# Patient Record
Sex: FEMALE | Race: BLACK OR AFRICAN AMERICAN | NOT HISPANIC OR LATINO | Employment: OTHER | URBAN - METROPOLITAN AREA
[De-identification: names, ages, dates, MRNs, and addresses within clinical notes are randomized per-mention and may not be internally consistent; named-entity substitution may affect disease eponyms.]

---

## 2017-06-29 ENCOUNTER — HISTORICAL (OUTPATIENT)
Dept: ADMINISTRATIVE | Facility: HOSPITAL | Age: 77
End: 2017-06-29

## 2018-05-10 ENCOUNTER — HISTORICAL (OUTPATIENT)
Dept: LAB | Facility: HOSPITAL | Age: 78
End: 2018-05-10

## 2018-05-13 LAB — FINAL CULTURE: NORMAL

## 2018-05-31 ENCOUNTER — HISTORICAL (OUTPATIENT)
Dept: LAB | Facility: HOSPITAL | Age: 78
End: 2018-05-31

## 2018-06-04 LAB — FINAL CULTURE: NORMAL

## 2021-03-16 ENCOUNTER — HISTORICAL (OUTPATIENT)
Dept: ADMINISTRATIVE | Facility: HOSPITAL | Age: 81
End: 2021-03-16

## 2021-03-16 LAB
ABS NEUT (OLG): 2.26 X10(3)/MCL (ref 2.1–9.2)
ALBUMIN SERPL-MCNC: 4.1 GM/DL (ref 3.4–4.8)
ALBUMIN/GLOB SERPL: 1.2 RATIO (ref 1.1–2)
ALP SERPL-CCNC: 77 UNIT/L (ref 40–150)
ALT SERPL-CCNC: 16 UNIT/L (ref 0–55)
APPEARANCE, UA: NORMAL
AST SERPL-CCNC: 23 UNIT/L (ref 5–34)
BACTERIA SPEC CULT: NORMAL /HPF
BASOPHILS # BLD AUTO: 0 X10(3)/MCL (ref 0–0.2)
BASOPHILS NFR BLD AUTO: 1 %
BILIRUB SERPL-MCNC: 0.5 MG/DL
BILIRUB UR QL STRIP: NEGATIVE
BILIRUBIN DIRECT+TOT PNL SERPL-MCNC: 0.2 MG/DL (ref 0–0.5)
BILIRUBIN DIRECT+TOT PNL SERPL-MCNC: 0.3 MG/DL (ref 0–0.8)
BUN SERPL-MCNC: 14.5 MG/DL (ref 9.8–20.1)
CALCIUM SERPL-MCNC: 9.4 MG/DL (ref 8.4–10.2)
CHLORIDE SERPL-SCNC: 110 MMOL/L (ref 98–107)
CHOLEST SERPL-MCNC: 175 MG/DL
CHOLEST/HDLC SERPL: 3 {RATIO} (ref 0–5)
CO2 SERPL-SCNC: 29 MMOL/L (ref 23–31)
COLOR UR: YELLOW
CREAT SERPL-MCNC: 0.93 MG/DL (ref 0.55–1.02)
EOSINOPHIL # BLD AUTO: 0 X10(3)/MCL (ref 0–0.9)
EOSINOPHIL NFR BLD AUTO: 1 %
ERYTHROCYTE [DISTWIDTH] IN BLOOD BY AUTOMATED COUNT: 13.2 % (ref 11.5–17)
GLOBULIN SER-MCNC: 3.5 GM/DL (ref 2.4–3.5)
GLUCOSE (UA): NEGATIVE
GLUCOSE SERPL-MCNC: 99 MG/DL (ref 82–115)
HCT VFR BLD AUTO: 36.8 % (ref 37–47)
HDLC SERPL-MCNC: 56 MG/DL (ref 35–60)
HGB BLD-MCNC: 11.7 GM/DL (ref 12–16)
HGB UR QL STRIP: NEGATIVE
KETONES UR QL STRIP: NEGATIVE
LDLC SERPL CALC-MCNC: 108 MG/DL (ref 50–140)
LEUKOCYTE ESTERASE UR QL STRIP: NEGATIVE
LYMPHOCYTES # BLD AUTO: 1.5 X10(3)/MCL (ref 0.6–4.6)
LYMPHOCYTES NFR BLD AUTO: 36 %
MCH RBC QN AUTO: 28.6 PG (ref 27–31)
MCHC RBC AUTO-ENTMCNC: 31.8 GM/DL (ref 33–36)
MCV RBC AUTO: 90 FL (ref 80–94)
MONOCYTES # BLD AUTO: 0.3 X10(3)/MCL (ref 0.1–1.3)
MONOCYTES NFR BLD AUTO: 7 %
NEUTROPHILS # BLD AUTO: 2.26 X10(3)/MCL (ref 2.1–9.2)
NEUTROPHILS NFR BLD AUTO: 54 %
NITRITE UR QL STRIP: NEGATIVE
PH UR STRIP: 6 [PH] (ref 5–9)
PLATELET # BLD AUTO: 260 X10(3)/MCL (ref 130–400)
PMV BLD AUTO: 9.1 FL (ref 9.4–12.4)
POTASSIUM SERPL-SCNC: 4.6 MMOL/L (ref 3.5–5.1)
PROT SERPL-MCNC: 7.6 GM/DL (ref 5.8–7.6)
PROT UR QL STRIP: NEGATIVE
RBC # BLD AUTO: 4.09 X10(6)/MCL (ref 4.2–5.4)
RBC #/AREA URNS HPF: NORMAL /[HPF]
SODIUM SERPL-SCNC: 143 MMOL/L (ref 136–145)
SP GR UR STRIP: 1.02 (ref 1–1.03)
SQUAMOUS EPITHELIAL, UA: NORMAL
TRIGL SERPL-MCNC: 56 MG/DL (ref 37–140)
UROBILINOGEN UR STRIP-ACNC: 0.2
VLDLC SERPL CALC-MCNC: 11 MG/DL
WBC # SPEC AUTO: 4.2 X10(3)/MCL (ref 4.5–11.5)
WBC #/AREA URNS HPF: NORMAL /[HPF]

## 2021-03-29 ENCOUNTER — HISTORICAL (OUTPATIENT)
Dept: RADIOLOGY | Facility: HOSPITAL | Age: 81
End: 2021-03-29

## 2021-05-24 ENCOUNTER — HISTORICAL (OUTPATIENT)
Dept: RADIOLOGY | Facility: HOSPITAL | Age: 81
End: 2021-05-24

## 2021-05-24 LAB — LIPASE SERPL-CCNC: 41 U/L

## 2022-03-21 ENCOUNTER — HISTORICAL (OUTPATIENT)
Dept: ADMINISTRATIVE | Facility: HOSPITAL | Age: 82
End: 2022-03-21

## 2022-03-21 LAB
ABS NEUT (OLG): 2.54 (ref 2.1–9.2)
ALBUMIN SERPL-MCNC: 3.8 G/DL (ref 3.4–4.8)
ALBUMIN/GLOB SERPL: 1 {RATIO} (ref 1.1–2)
ALP SERPL-CCNC: 72 U/L (ref 40–150)
ALT SERPL-CCNC: 13 U/L (ref 0–55)
APPEARANCE, UA: CLEAR
AST SERPL-CCNC: 20 U/L (ref 5–34)
BACTERIA SPEC CULT: NORMAL
BASOPHILS # BLD AUTO: 0 10*3/UL (ref 0–0.2)
BASOPHILS NFR BLD AUTO: 0 %
BILIRUB SERPL-MCNC: 0.5 MG/DL
BILIRUB UR QL STRIP: NEGATIVE
BILIRUBIN DIRECT+TOT PNL SERPL-MCNC: 0.2 (ref 0–0.5)
BILIRUBIN DIRECT+TOT PNL SERPL-MCNC: 0.3 (ref 0–0.8)
BUDDING YEAST: NORMAL
BUN SERPL-MCNC: 9.5 MG/DL (ref 9.8–20.1)
CALCIUM SERPL-MCNC: 9.5 MG/DL (ref 8.7–10.5)
CASTS, UA: NORMAL
CHLORIDE SERPL-SCNC: 106 MMOL/L (ref 98–107)
CHOLEST SERPL-MCNC: 227 MG/DL
CHOLEST/HDLC SERPL: 5 {RATIO} (ref 0–5)
CO2 SERPL-SCNC: 29 MMOL/L (ref 23–31)
COLOR UR: YELLOW
CREAT SERPL-MCNC: 0.91 MG/DL (ref 0.55–1.02)
CRYSTALS: NORMAL
EOSINOPHIL # BLD AUTO: 0 10*3/UL (ref 0–0.9)
EOSINOPHIL NFR BLD AUTO: 1 %
ERYTHROCYTE [DISTWIDTH] IN BLOOD BY AUTOMATED COUNT: 13.2 % (ref 11.5–17)
GLOBULIN SER-MCNC: 3.9 G/DL (ref 2.4–3.5)
GLUCOSE (UA): NEGATIVE
GLUCOSE SERPL-MCNC: 85 MG/DL (ref 82–115)
HCT VFR BLD AUTO: 36.5 % (ref 37–47)
HDLC SERPL-MCNC: 49 MG/DL (ref 35–60)
HEMOLYSIS INTERF INDEX SERPL-ACNC: <0
HGB BLD-MCNC: 12.3 G/DL (ref 12–16)
HGB UR QL STRIP: NEGATIVE
ICTERIC INTERF INDEX SERPL-ACNC: 1
KETONES UR QL STRIP: NEGATIVE
LDLC SERPL CALC-MCNC: 150 MG/DL (ref 50–140)
LEUKOCYTE ESTERASE UR QL STRIP: NEGATIVE
LIPEMIC INTERF INDEX SERPL-ACNC: 9
LYMPHOCYTES # BLD AUTO: 1.3 10*3/UL (ref 0.6–4.6)
LYMPHOCYTES NFR BLD AUTO: 31 %
MANUAL DIFF? (OHS): NO
MCH RBC QN AUTO: 29.4 PG (ref 27–31)
MCHC RBC AUTO-ENTMCNC: 33.7 G/DL (ref 33–36)
MCV RBC AUTO: 87.3 FL (ref 80–94)
MONOCYTES # BLD AUTO: 0.4 10*3/UL (ref 0.1–1.3)
MONOCYTES NFR BLD AUTO: 9 %
NEUTROPHILS # BLD AUTO: 2.54 10*3/UL (ref 2.1–9.2)
NEUTROPHILS NFR BLD AUTO: 59 %
NITRITE UR QL STRIP: NEGATIVE
PH UR STRIP: 6.5 [PH] (ref 5–9)
PLATELET # BLD AUTO: 246 10*3/UL (ref 130–400)
PMV BLD AUTO: 9 FL (ref 9.4–12.4)
POTASSIUM SERPL-SCNC: 4.1 MMOL/L (ref 3.5–5.1)
PROT SERPL-MCNC: 7.7 G/DL (ref 5.8–7.6)
PROT UR QL STRIP: NEGATIVE
RBC # BLD AUTO: 4.18 10*6/UL (ref 4.2–5.4)
RBC #/AREA URNS HPF: NORMAL /[HPF] (ref 0–2)
SMALL ROUND CELLS, UA: NORMAL
SODIUM SERPL-SCNC: 140 MMOL/L (ref 136–145)
SP GR UR STRIP: 1.02 (ref 1–1.03)
SPERM URNS QL MICRO: NORMAL
SQUAMOUS EPITHELIAL, UA: NORMAL (ref 0–4)
TRIGL SERPL-MCNC: 141 MG/DL (ref 37–140)
UROBILINOGEN UR STRIP-ACNC: 0.2
VLDLC SERPL CALC-MCNC: 28 MG/DL
WBC # SPEC AUTO: 4.3 10*3/UL (ref 4.5–11.5)
WBC #/AREA URNS HPF: NORMAL /[HPF] (ref 0–2)

## 2022-04-10 ENCOUNTER — HISTORICAL (OUTPATIENT)
Dept: ADMINISTRATIVE | Facility: HOSPITAL | Age: 82
End: 2022-04-10

## 2022-04-26 VITALS
BODY MASS INDEX: 28.63 KG/M2 | OXYGEN SATURATION: 99 % | HEIGHT: 59 IN | SYSTOLIC BLOOD PRESSURE: 130 MMHG | DIASTOLIC BLOOD PRESSURE: 70 MMHG | WEIGHT: 142 LBS

## 2023-03-22 ENCOUNTER — TELEPHONE (OUTPATIENT)
Dept: INTERNAL MEDICINE | Facility: CLINIC | Age: 83
End: 2023-03-22
Payer: MEDICARE

## 2023-11-12 ENCOUNTER — OFFICE VISIT (OUTPATIENT)
Dept: URGENT CARE | Facility: CLINIC | Age: 83
End: 2023-11-12
Payer: MEDICARE

## 2023-11-12 VITALS
OXYGEN SATURATION: 98 % | DIASTOLIC BLOOD PRESSURE: 78 MMHG | WEIGHT: 141 LBS | BODY MASS INDEX: 29.6 KG/M2 | HEART RATE: 83 BPM | HEIGHT: 58 IN | SYSTOLIC BLOOD PRESSURE: 127 MMHG | TEMPERATURE: 98 F | RESPIRATION RATE: 18 BRPM

## 2023-11-12 DIAGNOSIS — J01.40 ACUTE NON-RECURRENT PANSINUSITIS: ICD-10-CM

## 2023-11-12 DIAGNOSIS — R05.9 COUGH, UNSPECIFIED TYPE: Primary | ICD-10-CM

## 2023-11-12 LAB
CTP QC/QA: YES
MOLECULAR STREP A: NEGATIVE
POC MOLECULAR INFLUENZA A AGN: NEGATIVE
POC MOLECULAR INFLUENZA B AGN: NEGATIVE
SARS-COV-2 RDRP RESP QL NAA+PROBE: NEGATIVE

## 2023-11-12 PROCEDURE — 87502 POCT INFLUENZA A/B MOLECULAR: ICD-10-PCS | Mod: QW,,, | Performed by: NURSE PRACTITIONER

## 2023-11-12 PROCEDURE — 87502 INFLUENZA DNA AMP PROBE: CPT | Mod: QW,,, | Performed by: NURSE PRACTITIONER

## 2023-11-12 PROCEDURE — 87651 STREP A DNA AMP PROBE: CPT | Mod: QW,,, | Performed by: NURSE PRACTITIONER

## 2023-11-12 PROCEDURE — 87651 POCT STREP A MOLECULAR: ICD-10-PCS | Mod: QW,,, | Performed by: NURSE PRACTITIONER

## 2023-11-12 PROCEDURE — 87635 SARS-COV-2 COVID-19 AMP PRB: CPT | Mod: QW,,, | Performed by: NURSE PRACTITIONER

## 2023-11-12 PROCEDURE — 99213 OFFICE O/P EST LOW 20 MIN: CPT | Mod: ,,, | Performed by: NURSE PRACTITIONER

## 2023-11-12 PROCEDURE — 87635: ICD-10-PCS | Mod: QW,,, | Performed by: NURSE PRACTITIONER

## 2023-11-12 PROCEDURE — 99213 PR OFFICE/OUTPT VISIT, EST, LEVL III, 20-29 MIN: ICD-10-PCS | Mod: ,,, | Performed by: NURSE PRACTITIONER

## 2023-11-12 RX ORDER — CHLOPHEDIANOL HCL AND PYRILAMINE MALEATE 12.5; 12.5 MG/5ML; MG/5ML
10 SOLUTION ORAL 3 TIMES DAILY PRN
Qty: 120 ML | Refills: 0 | Status: SHIPPED | OUTPATIENT
Start: 2023-11-12 | End: 2023-11-19

## 2023-11-12 RX ORDER — PREDNISONE 10 MG/1
30 TABLET ORAL DAILY
Qty: 15 TABLET | Refills: 0 | Status: SHIPPED | OUTPATIENT
Start: 2023-11-12 | End: 2023-11-17

## 2023-11-12 RX ORDER — AZITHROMYCIN 250 MG/1
TABLET, FILM COATED ORAL
Qty: 6 EACH | Refills: 0 | Status: SHIPPED | OUTPATIENT
Start: 2023-11-12 | End: 2023-11-17

## 2023-11-12 RX ORDER — PANTOPRAZOLE SODIUM 40 MG/1
40 TABLET, DELAYED RELEASE ORAL
COMMUNITY
Start: 2021-10-12

## 2023-11-12 NOTE — PROGRESS NOTES
"Subjective:      Patient ID: Nisha Arroyo is a 83 y.o. female.    Vitals:  height is 4' 10" (1.473 m) and weight is 64 kg (141 lb). Her temperature is 98.2 °F (36.8 °C). Her blood pressure is 127/78 and her pulse is 83. Her respiration is 18 and oxygen saturation is 98%.     Chief Complaint: Nasal Congestion ( Patient is a 83 y.o. female who presents to urgent care with complaints of coughing, chest congestion, sore throat, ear pain x4 days. Alleviating factors include zyrtec with mild amount of relief. )    83-year-old  female presents to clinic complaining of sinus pain, productive yellow cough, ear pressure, and neck pain secondary to swollen glands x1 week.  Patient states symptoms are progressively getting worse.  Patient denies any fever, abdominal pain, shortness of breath, chest pain, or nausea and vomiting.      Constitution: Negative for chills and fever.   HENT:  Positive for ear pain, congestion, sinus pain, sinus pressure and sore throat.    Neck: Positive for painful lymph nodes.   Cardiovascular: Negative.    Eyes: Negative.    Respiratory: Negative.     Gastrointestinal: Negative.    Endocrine: negative.   Genitourinary: Negative.    Musculoskeletal: Negative.    Skin: Negative.    Allergic/Immunologic: Negative.    Neurological:  Positive for headaches.   Hematologic/Lymphatic: Positive for swollen lymph nodes.   Psychiatric/Behavioral: Negative.        Objective:     Physical Exam   Constitutional: She is oriented to person, place, and time. She appears well-developed. She is cooperative. She appears ill.   HENT:   Head: Normocephalic and atraumatic.   Ears:   Right Ear: Hearing, external ear and ear canal normal. Tympanic membrane is bulging.   Left Ear: Hearing, external ear and ear canal normal. Tympanic membrane is bulging.   Nose: Sinus tenderness and congestion present. No mucosal edema or nasal deformity. No epistaxis. Right sinus exhibits maxillary sinus tenderness and " frontal sinus tenderness. Left sinus exhibits maxillary sinus tenderness and frontal sinus tenderness.   Mouth/Throat: Uvula is midline and oropharynx is clear and moist. Mucous membranes are dry. No trismus in the jaw. Normal dentition. No uvula swelling.   Eyes: Conjunctivae and lids are normal. Pupils are equal, round, and reactive to light.   Neck: Trachea normal and phonation normal. Neck supple.   Cardiovascular: Normal rate, regular rhythm, normal heart sounds and normal pulses.   Pulmonary/Chest: Effort normal and breath sounds normal.   Abdominal: Normal appearance and bowel sounds are normal. Soft.   Musculoskeletal: Normal range of motion.         General: Normal range of motion.   Lymphadenopathy:     She has cervical adenopathy.        Right cervical: Superficial cervical adenopathy present.        Left cervical: Superficial cervical adenopathy present.   Neurological: She is alert and oriented to person, place, and time. She exhibits normal muscle tone.   Skin: Skin is warm, dry and intact.   Psychiatric: Her speech is normal and behavior is normal. Judgment and thought content normal.   Nursing note and vitals reviewed.      Assessment:     1. Cough, unspecified type    2. Acute non-recurrent pansinusitis        Plan:   DISCHARGE INSTRUCTIONS:    Call your local emergency number (911 in the [place]) if:  You have chest pain or trouble breathing.    Seek care immediately if:  You have a fever over 102ºF (39ºC).    Call your doctor if:  You have a low fever.  Your sore throat gets worse or you see white or yellow spots in your throat.  Your symptoms get worse after 3 to 5 days or are not better in 14 days.  You have a rash anywhere on your skin.  You have large, tender lumps in your neck.  You have thick, green, or yellow drainage from your nose.  You cough up thick yellow, green, or bloody mucus.  You have a bad earache.  You have questions or concerns about your condition or care.    Medicines:  You may  need any of the following:    Decongestants help reduce nasal congestion and help you breathe more easily. If you take decongestant pills, they may make you feel restless or cause problems with your sleep. Do not use decongestant sprays for more than a few days.    Cough suppressants help reduce coughing. Ask your healthcare provider which type of cough medicine is best for you.    NSAIDs , such as ibuprofen, help decrease swelling, pain, and fever. NSAIDs can cause stomach bleeding or kidney problems in certain people. If you take blood thinner medicine, always ask your healthcare provider if NSAIDs are safe for you. Always read the medicine label and follow directions.    Acetaminophen decreases pain and fever. It is available without a doctor's order. Ask how much to take and how often to take it. Follow directions. Read the labels of all other medicines you are using to see if they also contain acetaminophen, or ask your doctor or pharmacist. Acetaminophen can cause liver damage if not taken correctly.    Take your medicine as directed. Contact your healthcare provider if you think your medicine is not helping or if you have side effects. Tell your provider if you are allergic to any medicine. Keep a list of the medicines, vitamins, and herbs you take. Include the amounts, and when and why you take them. Bring the list or the pill bottles to follow-up visits. Carry your medicine list with you in case of an emergency.    Self-care:  Rest as much as possible. Slowly start to do more each day.    Drink more liquids as directed. Liquids will help thin and loosen mucus so you can cough it up. Liquids will also help prevent dehydration. Liquids that help prevent dehydration include water, fruit juice, and broth. Do not drink liquids that contain caffeine. Caffeine can increase your risk for dehydration. Ask your healthcare provider how much liquid to drink each day.  Soothe a sore throat. Gargle with warm salt water.  Make salt water by dissolving ¼ teaspoon salt in 1 cup warm water. You may also suck on hard candy or throat lozenges. You may use a sore throat spray.    Use a humidifier or vaporizer. Use a cool mist humidifier or a vaporizer to increase air moisture in your home. This may make it easier for you to breathe and help decrease your cough.  Use saline nasal drops as directed. These help relieve congestion.    Apply petroleum-based jelly around the outside of your nostrils. This can decrease irritation from blowing your nose.    Do not smoke. Nicotine and other chemicals in cigarettes and cigars can make your symptoms worse. They can also cause infections such as bronchitis or pneumonia. Ask your healthcare provider for information if you currently smoke and need help to quit. E-cigarettes or smokeless tobacco still contain nicotine. Talk to your healthcare provider before you use these products.    Prevent a cold:  Wash your hands often. Use soap and water every time you wash your hands. Rub your soapy hands together, lacing your fingers. Use the fingers of one hand to scrub under the nails of the other hand. Wash for at least 20 seconds. Rinse with warm, running water for several seconds. Then dry your hands. Use hand  gel if soap and water are not available. Do not touch your eyes or mouth without washing your hands first.    Handwashing    Cover a sneeze or cough. Use a tissue that covers your mouth and nose. Put the used tissue in the trash right away. Use the bend of your arm if a tissue is not available. Wash your hands well with soap and water or use a hand . Do not stand close to anyone who is sneezing or coughing.    Try to stay away from others while you are sick. This is especially important during the first 2 to 3 days when the virus is more easily spread. Wait until a fever, cough, or other symptoms are gone before you return to work or other regular activities.    Do not share items  "while you are sick. This includes food, drinks, eating utensils, and dishes.     Cough, unspecified type  -     POCT COVID-19 Rapid Screening  -     POCT Influenza A/B Molecular  -     POCT Strep A, Molecular    Acute non-recurrent pansinusitis    Other orders  -     azithromycin (Z-OMAR) 250 MG tablet; Take 2 tablets by mouth on day 1; Take 1 tablet by mouth on days 2-5  Dispense: 6 each; Refill: 0  -     predniSONE (DELTASONE) 10 MG tablet; Take 3 tablets (30 mg total) by mouth once daily. for 5 days  Dispense: 15 tablet; Refill: 0  -     pyrilamine-chlophedianoL (NINJACOF) 12.5-12.5 mg/5 mL Liqd; Take 10 mLs by mouth 3 (three) times daily as needed (cough).  Dispense: 120 mL; Refill: 0                       Previous History      Review of patient's allergies indicates:   Allergen Reactions    Hydrocodone Hives    Oxycodone Hives and Itching    Ciprofloxacin        History reviewed. No pertinent past medical history.  Current Outpatient Medications   Medication Instructions    azithromycin (Z-OMAR) 250 MG tablet Take 2 tablets by mouth on day 1; Take 1 tablet by mouth on days 2-5<BR>    pantoprazole (PROTONIX) 40 mg, Oral    predniSONE (DELTASONE) 30 mg, Oral, Daily    pyrilamine-chlophedianoL (NINJACOF) 12.5-12.5 mg/5 mL Liqd 10 mLs, Oral, 3 times daily PRN     History reviewed. No pertinent surgical history.  Family History   Problem Relation Age of Onset    Cancer Mother     Emphysema Father        Social History     Tobacco Use    Smoking status: Never    Smokeless tobacco: Never   Substance Use Topics    Alcohol use: Never    Drug use: Never        Physical Exam      Vital Signs Reviewed   /78   Pulse 83   Temp 98.2 °F (36.8 °C)   Resp 18   Ht 4' 10" (1.473 m)   Wt 64 kg (141 lb)   SpO2 98%   BMI 29.47 kg/m²        Procedures    Procedures     Labs     Results for orders placed or performed in visit on 11/12/23   POCT COVID-19 Rapid Screening   Result Value Ref Range    POC Rapid COVID " Negative Negative     Acceptable Yes    POCT Influenza A/B Molecular   Result Value Ref Range    POC Molecular Influenza A Ag Negative Negative, Not Reported    POC Molecular Influenza B Ag Negative Negative, Not Reported     Acceptable Yes    POCT Strep A, Molecular   Result Value Ref Range    Molecular Strep A, POC Negative Negative     Acceptable Yes

## 2024-05-24 ENCOUNTER — OFFICE VISIT (OUTPATIENT)
Dept: URGENT CARE | Facility: CLINIC | Age: 84
End: 2024-05-24
Payer: MEDICARE

## 2024-05-24 VITALS
RESPIRATION RATE: 18 BRPM | HEIGHT: 58 IN | TEMPERATURE: 99 F | BODY MASS INDEX: 29.6 KG/M2 | OXYGEN SATURATION: 98 % | HEART RATE: 102 BPM | WEIGHT: 141 LBS | SYSTOLIC BLOOD PRESSURE: 129 MMHG | DIASTOLIC BLOOD PRESSURE: 84 MMHG

## 2024-05-24 DIAGNOSIS — U07.1 COVID-19: Primary | ICD-10-CM

## 2024-05-24 DIAGNOSIS — U07.1 COVID-19 VIRUS DETECTED: ICD-10-CM

## 2024-05-24 DIAGNOSIS — J32.9 SINUSITIS, UNSPECIFIED CHRONICITY, UNSPECIFIED LOCATION: ICD-10-CM

## 2024-05-24 LAB
CTP QC/QA: YES
SARS-COV-2 RDRP RESP QL NAA+PROBE: POSITIVE

## 2024-05-24 PROCEDURE — 87635 SARS-COV-2 COVID-19 AMP PRB: CPT | Mod: QW,,,

## 2024-05-24 PROCEDURE — 99213 OFFICE O/P EST LOW 20 MIN: CPT | Mod: ,,,

## 2024-05-24 RX ORDER — DOXYCYCLINE 100 MG/1
100 CAPSULE ORAL EVERY 12 HOURS
Qty: 14 CAPSULE | Refills: 0 | Status: SHIPPED | OUTPATIENT
Start: 2024-05-24 | End: 2024-05-31

## 2024-05-24 RX ORDER — CETIRIZINE HYDROCHLORIDE 5 MG/1
5 TABLET ORAL DAILY
COMMUNITY

## 2024-05-24 NOTE — PATIENT INSTRUCTIONS
COVID positive  Discussed Paxlovid therapy is an antiviral to keep you from developing severe complications from COVID including hospitalization.  COVID-19 is a virus.  Doxycycline prescription is an antibiotic to cover for bacterial infection.  If symptoms fail to improve or you develop fever, may beginning antibiotic therapy.      Fever / Body Aches: Use OTC Tylenol or Motrin per package instructions for fever or body aches.  Sore Throat: Use OTC lozenges or throat sprays, gargle with warm salt and water, warm tea with honey.   Cough:  Over-the-counter cough medication for daytime cough as needed and as directed.  Addition of over-the-counter antihistamine such as Claritin, Zyrtec for runny nose, congestion, postnasal drip.  May also use Flonase nasal steroid spray.  Cover your mouth with coughing, avoid sharing cups or lip balm, wash your hand before eating or touching your face.      The updated Respiratory Virus Guidance recommends that people stay home and away from others until at least 24 hours after both their symptoms are getting better overall, and they have not had a fever (and are not using fever-reducing medication). Note that depending on the length of symptoms, this period could be shorter, the same, or longer than the previous guidance for COVID-19.     Follow up with you primary care doctor as needed.      Updated CDC guidelines can be found at: https://www.cdc.gov/coronavirus/2019-ncov/hcp/xsyoti-fj-afjv.html    Seek emergency care for breathing difficulties, chest pain, shortness of breath, or confusion. Given the nature of this disease, severe respiratory distress can develop suddenly on day 8 or 9 of clinical course.  Follow up with you primary care doctor as needed.      Updated CDC guidelines can be found at: https://www.cdc.gov/coronavirus/2019-ncov/hcp/jpnbeg-hv-wngx.html

## 2024-05-24 NOTE — PROGRESS NOTES
"Subjective:      Patient ID: Nisha Arroyo is a 84 y.o. female.    Vitals:  height is 4' 10" (1.473 m) and weight is 64 kg (141 lb). Her temperature is 98.7 °F (37.1 °C). Her blood pressure is 129/84 and her pulse is 102. Her respiration is 18 and oxygen saturation is 98%.     Chief Complaint: Sinus Problem    84 y.o. female presents presents to clinic with c/o cough that has been present for at least 4 days however she believes it may have been longer.  Cough worsening last night.  Pt also c/o post nasal drip and nasal congestion, rhinorrhea, bilateral maxillary sinus pressure. Pt has tried zyrtec.  She does report some reproducible discomfort near her ribs with coughing.  Chills today.  She feels the same as she did last time she had a sinus infection.  Denies fever, body aches,  neck stiffness, rash, GI symptoms, shortness of breath.  Patient has been vaccinated for COVID and has had COVID in the past and did not require hospitalization.  She does not believe she is taken Paxlovid therapy in the past.    ROS   Objective:     Physical Exam   Constitutional: She is oriented to person, place, and time. She appears well-developed. She is cooperative.  Non-toxic appearance. She does not appear ill. No distress.   HENT:   Head: Normocephalic and atraumatic.   Ears:   Right Ear: Hearing, tympanic membrane, external ear and ear canal normal.   Left Ear: Hearing, tympanic membrane, external ear and ear canal normal.   Nose: Rhinorrhea and congestion present. No mucosal edema or nasal deformity. No epistaxis. Right sinus exhibits maxillary sinus tenderness. Right sinus exhibits no frontal sinus tenderness. Left sinus exhibits maxillary sinus tenderness. Left sinus exhibits no frontal sinus tenderness.   Mouth/Throat: Uvula is midline, oropharynx is clear and moist and mucous membranes are normal. Mucous membranes are moist. No trismus in the jaw. Normal dentition. No uvula swelling. No oropharyngeal exudate, posterior " oropharyngeal edema or posterior oropharyngeal erythema.   Eyes: Conjunctivae and lids are normal. No scleral icterus.   Neck: Trachea normal and phonation normal. Neck supple. No edema present. No erythema present. No neck rigidity present.   Cardiovascular: Normal rate, regular rhythm, normal heart sounds and normal pulses.   Pulmonary/Chest: Effort normal and breath sounds normal. No respiratory distress. She has no decreased breath sounds. She has no wheezes. She has no rhonchi. She has no rales.   Abdominal: Normal appearance.   Musculoskeletal: Normal range of motion.         General: No deformity. Normal range of motion.   Neurological: She is alert and oriented to person, place, and time. She exhibits normal muscle tone. Coordination normal.   Skin: Skin is warm, dry, intact, not diaphoretic and not pale.   Psychiatric: Her speech is normal and behavior is normal. Judgment and thought content normal.   Nursing note and vitals reviewed.         Previous History      Review of patient's allergies indicates:   Allergen Reactions    Hydrocodone Hives    Oxycodone Hives and Itching    Ciprofloxacin        Past Medical History:   Diagnosis Date    GERD (gastroesophageal reflux disease)      Current Outpatient Medications   Medication Instructions    cetirizine (ZYRTEC) 5 mg, Oral, Daily    doxycycline (MONODOX) 100 mg, Oral, Every 12 hours    nirmatrelvir-ritonavir 150-100 mg DsPk 2 tablets, Oral, 2 times daily, Take 2 tablets by mouth 2 times daily.  Each dose contains 1 nirmatrelvir (pink tablet) and 1 ritonavir (white tablet). Take all 3 tablets together.    pantoprazole (PROTONIX) 40 mg, Oral     History reviewed. No pertinent surgical history.  Family History   Problem Relation Name Age of Onset    Cancer Mother      Emphysema Father         Social History     Tobacco Use    Smoking status: Never    Smokeless tobacco: Never   Substance Use Topics    Alcohol use: Never    Drug use: Never        Physical Exam   "    Vital Signs Reviewed   /84   Pulse 102   Temp 98.7 °F (37.1 °C)   Resp 18   Ht 4' 10" (1.473 m)   Wt 64 kg (141 lb)   SpO2 98%   BMI 29.47 kg/m²        Procedures    Procedures     Labs     Results for orders placed or performed in visit on 05/24/24   POCT COVID-19 Rapid Screening   Result Value Ref Range    POC Rapid COVID Positive (A) Negative     Acceptable Yes        Assessment:     1. COVID-19    2. Sinusitis, unspecified chronicity, unspecified location        Plan:       COVID-19  -     POCT COVID-19 Rapid Screening  -     nirmatrelvir-ritonavir 150-100 mg DsPk; Take 2 tablets by mouth 2 (two) times a day. Take 2 tablets by mouth 2 times daily.  Each dose contains 1 nirmatrelvir (pink tablet) and 1 ritonavir (white tablet). Take all 3 tablets together. for 5 days  Dispense: 20 tablet; Refill: 0    Sinusitis, unspecified chronicity, unspecified location  -     doxycycline (MONODOX) 100 MG capsule; Take 1 capsule (100 mg total) by mouth every 12 (twelve) hours. for 7 days  Dispense: 14 capsule; Refill: 0    Based on patient's age discussed Paxlovid therapy to prevent severe complications of COVID-19.    Lung sounds clear, oxygen 98%, patient is afebrile.  Due to this and prescribing antibiotics will avoid chest x-ray in clinic at this time.  Patient denies any history of kidney disease, last kidney function 4 days ago, creatinine 1.02, EGFR 54  Same as a year ago.  Will cover with renal dose Paxlovid.  Discussed Paxlovid therapy is an antiviral to keep you from developing severe complications from COVID including hospitalization.  COVID-19 is a virus.  Doxycycline prescription is an antibiotic to cover for bacterial infection.  If symptoms fail to improve or you develop fever, may beginning antibiotic therapy.      COVID positive  Fever / Body Aches: Use OTC Tylenol or Motrin per package instructions for fever or body aches.  Sore Throat: Use OTC lozenges or throat sprays, gargle " with warm salt and water, warm tea with honey.   Cough:  Over-the-counter cough medication for daytime cough as needed and as directed.  Addition of over-the-counter antihistamine such as Claritin, Zyrtec for runny nose, congestion, postnasal drip.  May also use Flonase nasal steroid spray.  Cover your mouth with coughing, avoid sharing cups or lip balm, wash your hand before eating or touching your face.      The updated Respiratory Virus Guidance recommends that people stay home and away from others until at least 24 hours after both their symptoms are getting better overall, and they have not had a fever (and are not using fever-reducing medication). Note that depending on the length of symptoms, this period could be shorter, the same, or longer than the previous guidance for COVID-19.     Follow up with you primary care doctor as needed.      Updated CDC guidelines can be found at: https://www.cdc.gov/coronavirus/2019-ncov/hcp/hlisth-hw-xujr.html    Seek emergency care for breathing difficulties, chest pain, shortness of breath, or confusion. Given the nature of this disease, severe respiratory distress can develop suddenly on day 8 or 9 of clinical course.  Follow up with you primary care doctor as needed.      Updated CDC guidelines can be found at: https://www.cdc.gov/coronavirus/2019-ncov/hcp/iudarx-mc-ywen.html

## 2024-06-01 ENCOUNTER — HOSPITAL ENCOUNTER (EMERGENCY)
Facility: HOSPITAL | Age: 84
Discharge: HOME | End: 2024-06-01
Attending: EMERGENCY MEDICINE
Payer: MEDICARE

## 2024-06-01 VITALS
HEART RATE: 71 BPM | OXYGEN SATURATION: 98 % | SYSTOLIC BLOOD PRESSURE: 168 MMHG | WEIGHT: 140 LBS | DIASTOLIC BLOOD PRESSURE: 83 MMHG | BODY MASS INDEX: 28.22 KG/M2 | TEMPERATURE: 98 F | RESPIRATION RATE: 14 BRPM | HEIGHT: 59 IN

## 2024-06-01 DIAGNOSIS — K20.80 PILL ESOPHAGITIS: Primary | ICD-10-CM

## 2024-06-01 DIAGNOSIS — T50.905A PILL ESOPHAGITIS: Primary | ICD-10-CM

## 2024-06-01 DIAGNOSIS — R07.9 ACUTE CHEST PAIN: ICD-10-CM

## 2024-06-01 DIAGNOSIS — R07.9 CHEST PAIN: ICD-10-CM

## 2024-06-01 LAB
ALBUMIN SERPL-MCNC: 3.5 G/DL (ref 3.4–4.8)
ALBUMIN/GLOB SERPL: 1 RATIO (ref 1.1–2)
ALP SERPL-CCNC: 63 UNIT/L (ref 40–150)
ALT SERPL-CCNC: 11 UNIT/L (ref 0–55)
ANION GAP SERPL CALC-SCNC: 11 MEQ/L
AST SERPL-CCNC: 18 UNIT/L (ref 5–34)
BASOPHILS # BLD AUTO: 0.02 X10(3)/MCL
BASOPHILS NFR BLD AUTO: 0.3 %
BILIRUB SERPL-MCNC: 0.4 MG/DL
BNP BLD-MCNC: 34.4 PG/ML
BUN SERPL-MCNC: 14.8 MG/DL (ref 9.8–20.1)
CALCIUM SERPL-MCNC: 9 MG/DL (ref 8.4–10.2)
CHLORIDE SERPL-SCNC: 107 MMOL/L (ref 98–107)
CO2 SERPL-SCNC: 23 MMOL/L (ref 23–31)
CREAT SERPL-MCNC: 0.87 MG/DL (ref 0.55–1.02)
CREAT/UREA NIT SERPL: 17
EOSINOPHIL # BLD AUTO: 0.06 X10(3)/MCL (ref 0–0.9)
EOSINOPHIL NFR BLD AUTO: 0.9 %
ERYTHROCYTE [DISTWIDTH] IN BLOOD BY AUTOMATED COUNT: 12.8 % (ref 11.5–17)
GFR SERPLBLD CREATININE-BSD FMLA CKD-EPI: >60 ML/MIN/1.73/M2
GLOBULIN SER-MCNC: 3.4 GM/DL (ref 2.4–3.5)
GLUCOSE SERPL-MCNC: 98 MG/DL (ref 82–115)
HCT VFR BLD AUTO: 32.3 % (ref 37–47)
HGB BLD-MCNC: 10.8 G/DL (ref 12–16)
IMM GRANULOCYTES # BLD AUTO: 0.01 X10(3)/MCL (ref 0–0.04)
IMM GRANULOCYTES NFR BLD AUTO: 0.1 %
LYMPHOCYTES # BLD AUTO: 1.89 X10(3)/MCL (ref 0.6–4.6)
LYMPHOCYTES NFR BLD AUTO: 28.1 %
MCH RBC QN AUTO: 29 PG (ref 27–31)
MCHC RBC AUTO-ENTMCNC: 33.4 G/DL (ref 33–36)
MCV RBC AUTO: 86.8 FL (ref 80–94)
MONOCYTES # BLD AUTO: 0.59 X10(3)/MCL (ref 0.1–1.3)
MONOCYTES NFR BLD AUTO: 8.8 %
NEUTROPHILS # BLD AUTO: 4.16 X10(3)/MCL (ref 2.1–9.2)
NEUTROPHILS NFR BLD AUTO: 61.8 %
PLATELET # BLD AUTO: 214 X10(3)/MCL (ref 130–400)
PMV BLD AUTO: 9.1 FL (ref 7.4–10.4)
POTASSIUM SERPL-SCNC: 3.6 MMOL/L (ref 3.5–5.1)
PROT SERPL-MCNC: 6.9 GM/DL (ref 5.8–7.6)
RBC # BLD AUTO: 3.72 X10(6)/MCL (ref 4.2–5.4)
SODIUM SERPL-SCNC: 141 MMOL/L (ref 136–145)
TROPONIN I SERPL-MCNC: <0.01 NG/ML (ref 0–0.04)
WBC # SPEC AUTO: 6.73 X10(3)/MCL (ref 4.5–11.5)

## 2024-06-01 PROCEDURE — 25000003 PHARM REV CODE 250: Performed by: EMERGENCY MEDICINE

## 2024-06-01 PROCEDURE — 93010 ELECTROCARDIOGRAM REPORT: CPT | Mod: ,,, | Performed by: INTERNAL MEDICINE

## 2024-06-01 PROCEDURE — 83880 ASSAY OF NATRIURETIC PEPTIDE: CPT | Performed by: EMERGENCY MEDICINE

## 2024-06-01 PROCEDURE — 94760 N-INVAS EAR/PLS OXIMETRY 1: CPT

## 2024-06-01 PROCEDURE — 99285 EMERGENCY DEPT VISIT HI MDM: CPT | Mod: 25

## 2024-06-01 PROCEDURE — 84484 ASSAY OF TROPONIN QUANT: CPT | Performed by: EMERGENCY MEDICINE

## 2024-06-01 PROCEDURE — 25000242 PHARM REV CODE 250 ALT 637 W/ HCPCS: Performed by: EMERGENCY MEDICINE

## 2024-06-01 PROCEDURE — 85025 COMPLETE CBC W/AUTO DIFF WBC: CPT | Performed by: EMERGENCY MEDICINE

## 2024-06-01 PROCEDURE — 80053 COMPREHEN METABOLIC PANEL: CPT | Performed by: EMERGENCY MEDICINE

## 2024-06-01 PROCEDURE — 94640 AIRWAY INHALATION TREATMENT: CPT

## 2024-06-01 PROCEDURE — 25500020 PHARM REV CODE 255: Performed by: EMERGENCY MEDICINE

## 2024-06-01 PROCEDURE — 99900035 HC TECH TIME PER 15 MIN (STAT)

## 2024-06-01 PROCEDURE — 93005 ELECTROCARDIOGRAM TRACING: CPT

## 2024-06-01 PROCEDURE — 99900031 HC PATIENT EDUCATION (STAT)

## 2024-06-01 RX ORDER — ALUMINUM HYDROXIDE, MAGNESIUM HYDROXIDE, AND SIMETHICONE 1200; 120; 1200 MG/30ML; MG/30ML; MG/30ML
30 SUSPENSION ORAL ONCE
Status: COMPLETED | OUTPATIENT
Start: 2024-06-01 | End: 2024-06-01

## 2024-06-01 RX ORDER — LIDOCAINE HYDROCHLORIDE 20 MG/ML
15 SOLUTION OROPHARYNGEAL ONCE
Status: COMPLETED | OUTPATIENT
Start: 2024-06-01 | End: 2024-06-01

## 2024-06-01 RX ORDER — SUCRALFATE 1 G/1
1 TABLET ORAL
Qty: 56 TABLET | Refills: 0 | Status: SHIPPED | OUTPATIENT
Start: 2024-06-01 | End: 2024-06-15

## 2024-06-01 RX ORDER — ASPIRIN 325 MG
325 TABLET ORAL
Status: DISCONTINUED | OUTPATIENT
Start: 2024-06-01 | End: 2024-06-01 | Stop reason: HOSPADM

## 2024-06-01 RX ORDER — ALBUTEROL SULFATE 2.5 MG/.5ML
2.5 SOLUTION RESPIRATORY (INHALATION) EVERY 6 HOURS PRN
Qty: 10 EACH | Refills: 1 | Status: SHIPPED | OUTPATIENT
Start: 2024-06-01 | End: 2025-06-01

## 2024-06-01 RX ORDER — IPRATROPIUM BROMIDE AND ALBUTEROL SULFATE 2.5; .5 MG/3ML; MG/3ML
3 SOLUTION RESPIRATORY (INHALATION)
Status: COMPLETED | OUTPATIENT
Start: 2024-06-01 | End: 2024-06-01

## 2024-06-01 RX ORDER — PANTOPRAZOLE SODIUM 20 MG/1
20 TABLET, DELAYED RELEASE ORAL DAILY
Qty: 14 TABLET | Refills: 0 | Status: SHIPPED | OUTPATIENT
Start: 2024-06-01 | End: 2024-06-15

## 2024-06-01 RX ORDER — NITROGLYCERIN 0.4 MG/1
0.4 TABLET SUBLINGUAL EVERY 5 MIN PRN
Status: DISCONTINUED | OUTPATIENT
Start: 2024-06-01 | End: 2024-06-01 | Stop reason: HOSPADM

## 2024-06-01 RX ADMIN — LIDOCAINE HYDROCHLORIDE 15 ML: 20 SOLUTION ORAL at 06:06

## 2024-06-01 RX ADMIN — ALUMINUM HYDROXIDE, MAGNESIUM HYDROXIDE, AND SIMETHICONE 30 ML: 200; 200; 20 SUSPENSION ORAL at 06:06

## 2024-06-01 RX ADMIN — IPRATROPIUM BROMIDE AND ALBUTEROL SULFATE 3 ML: .5; 3 SOLUTION RESPIRATORY (INHALATION) at 01:06

## 2024-06-01 RX ADMIN — IOHEXOL 60 ML: 350 INJECTION, SOLUTION INTRAVENOUS at 04:06

## 2024-06-01 RX ADMIN — NITROGLYCERIN 0.4 MG: 0.4 TABLET, ORALLY DISINTEGRATING SUBLINGUAL at 03:06

## 2024-06-01 NOTE — DISCHARGE INSTRUCTIONS
Precautions to decrease risk -- We suggest the following measures to minimize the risk of medication-induced esophagitis:  ?Patients should take all pills in an upright posture.  ?Patients should drink at least 4 ounces of fluid with any medication and 8 ounces with medications that are strongly associated with pill esophagitis (eg, tetracycline, alendronate, potassium chloride, or quinidine).  ?Patients should remain upright for at least 10 minutes after taking any pills and for at least 30 minutes with pills that are strongly associated with pill esophagitis.

## 2024-06-01 NOTE — ED PROVIDER NOTES
Encounter Date: 6/1/2024       History     Chief Complaint   Patient presents with    Chest Pain     C/O cough and midline chest pain, onset 5/29. Dx with COVID 5/23, states she took 5 days of doxycycline, then stopped taking it because the chest pain started and she thought it was a medication reaction. Hypertensive in triage, denies PMH.      84 year-old female with history of sinus issues and acid reflux presenting with chest pain that began 2 days ago.  She reports that it is intermittent and described as pressure.  It is in the middle of her chest and gets worse with breathing out.  She reports that she feels mildly short of breath and has a cough but she states she always has these symptoms due to sinus issues.  She tested positive for COVID on 05/23 and took 5 days of doxycycline but stopped because she thought the chest pain may be related.    The history is provided by the patient.     Review of patient's allergies indicates:   Allergen Reactions    Hydrocodone Hives    Oxycodone Hives and Itching    Ciprofloxacin      Past Medical History:   Diagnosis Date    GERD (gastroesophageal reflux disease)      No past surgical history on file.  Family History   Problem Relation Name Age of Onset    Cancer Mother      Emphysema Father       Social History     Tobacco Use    Smoking status: Never    Smokeless tobacco: Never   Substance Use Topics    Alcohol use: Never    Drug use: Never     Review of Systems   Respiratory:  Positive for cough and shortness of breath.    Cardiovascular:  Positive for chest pain.       Physical Exam     Initial Vitals [06/01/24 0032]   BP Pulse Resp Temp SpO2   (!) 195/102 105 16 97.9 °F (36.6 °C) 98 %      MAP       --         Physical Exam    Nursing note and vitals reviewed.  Constitutional: She appears well-developed and well-nourished. She is not diaphoretic. She does not appear ill. No distress.   HENT:   Head: Normocephalic and atraumatic.   Right Ear: External ear normal.    Left Ear: External ear normal.   Mouth/Throat: Oropharynx is clear and moist.   Eyes: Conjunctivae are normal.   Neck: Neck supple. No tracheal deviation present.   Cardiovascular:  Normal rate, regular rhythm and normal heart sounds.           No murmur heard.  Pulmonary/Chest: No respiratory distress. She has no wheezes. She has no rhonchi. She has no rales. She exhibits no tenderness.   Coarse breath sounds   Abdominal: Abdomen is soft. She exhibits no distension. There is no abdominal tenderness.   No right CVA tenderness.  No left CVA tenderness.   Musculoskeletal:         General: Normal range of motion.      Cervical back: Neck supple.     Neurological: She is alert and oriented to person, place, and time. GCS score is 15. GCS eye subscore is 4. GCS verbal subscore is 5. GCS motor subscore is 6.   Skin: Skin is warm and dry. Capillary refill takes less than 2 seconds. No rash noted. No pallor.         ED Course   Procedures  Labs Reviewed   COMPREHENSIVE METABOLIC PANEL - Abnormal; Notable for the following components:       Result Value    Albumin/Globulin Ratio 1.0 (*)     All other components within normal limits   CBC WITH DIFFERENTIAL - Abnormal; Notable for the following components:    RBC 3.72 (*)     Hgb 10.8 (*)     Hct 32.3 (*)     All other components within normal limits   B-TYPE NATRIURETIC PEPTIDE - Normal   TROPONIN I - Normal   CBC W/ AUTO DIFFERENTIAL    Narrative:     The following orders were created for panel order CBC auto differential.  Procedure                               Abnormality         Status                     ---------                               -----------         ------                     CBC with Differential[[id number]]       Abnormal            Final result                 Please view results for these tests on the individual orders.     EKG Readings: (Independently Interpreted)   Initial Reading: No STEMI. Rhythm: Normal Sinus Rhythm. Heart Rate: 95. Ectopy: No  Ectopy. Conduction: Normal. ST Segments: Normal ST Segments. T Waves: Normal. Axis: Normal.   Performed at 0026       Imaging Results              CTA Chest Non-Coronary (PE Studies) (Final result)  Result time 06/01/24 07:38:45      Final result by Bowen Krishnamurthy MD (06/01/24 07:38:45)                   Impression:      No evidence of pulmonary embolism through the level of the subsegmental pulmonary arteries.  There is no acute abnormality of the chest.    [employer] concordance      Electronically signed by: Bowen Krishnamurthy MD  Date:    06/01/2024  Time:    07:38               Narrative:    EXAMINATION:  CTA CHEST NON CORONARY (PE STUDIES)    CLINICAL HISTORY:  Pulmonary embolism (PE) suspected, high prob;    TECHNIQUE:  Axial images of the chest were obtained with IV contrast administration.  Coronal and sagittal reconstructions were provided.  Three dimensional and MIP images were obtained and evaluated.  Total DLP was 246 mGy-cm. Dose lowering technique and automated exposure control were utilized for this exam.    COMPARISON:  Chest radiograph 06/01/2024.    FINDINGS:  There is no filling defect within the pulmonary arteries through the level of the subsegmental pulmonary arteries.  There is no CT evidence of right heart strain.    The airway is widely patent.  There is no mediastinal or hilar lymphadenopathy.  The heart is normal in size.    The lung parenchyma is clear.  There is no pulmonary nodule or mass.  There is no pleural effusion.  There is no ground-glass or reticulonodular airspace opacity.    The upper abdomen demonstrates a simple hepatic cysts without acute abnormality.  There is no lytic or blastic osseous lesion.                        Preliminary result by Lucas Calderon MD (06/01/24 04:49:41)                   Impression:    1. No filling defects are seen in the pulmonary arteries to suggest pulmonary embolus.  2. No acute focal infiltrate or consolidation is seen. Correlate with clinical and  laboratory findings as regards additional evaluation and follow-up.  3. Details and other findings as discussed above.               Narrative:    START OF REPORT:  Technique: CT Scan of the chest was performed with intravenous contrast with direct axial images as well as sagittal and coronal reconstruction images pulmonary embolus protocol.    Dosage Information: Automated Exposure Control was utilized.    Comparison: None.    Clinical History: Chest pain, r/o pe.    Findings:  Neck: The visualized soft tissues of the neck appear unremarkable.  Mediastinum: There is a small hiatal hernia. The rest of the mediastinal structures are within normal limits.  Heart: Mild cardiomegaly is seen.  Aorta: Unremarkable appearing aorta.  Pulmonary Arteries: No filling defects are seen in the pulmonary arteries to suggest pulmonary embolus.  Lungs: There is moderate non specific dependent change at the lung bases. No acute focal infiltrate or consolidation is seen.  Pleura: No effusions or pneumothorax are identified.  Bony Structures:  Spine: Moderate spondylotic changes are seen in the thoracic spine. Anterior fusion changes of the lower cervical spine are partially seen.  Ribs: The visualized bilateral ribs appear unremarkable.  Abdomen: There is a well-defined hypodense structure in the right hepatic lobe. This is likely a large hepatic cyst. The rest of the visualized upper abdominal organs appear unremarkable.                                         X-Ray Chest AP Portable (In process)  Result time 06/01/24 08:46:13                     Medications   aspirin tablet 325 mg (325 mg Oral Not Given 6/1/24 0330)   nitroGLYCERIN SL tablet 0.4 mg (0.4 mg Sublingual Given 6/1/24 0334)   albuterol-ipratropium 2.5 mg-0.5 mg/3 mL nebulizer solution 3 mL (3 mLs Nebulization Given 6/1/24 0119)   iohexoL (OMNIPAQUE 350) injection 60 mL (60 mLs Intravenous Given 6/1/24 0411)   aluminum-magnesium hydroxide-simethicone 200-200-20 mg/5 mL  suspension 30 mL (30 mLs Oral Given 6/1/24 0618)     And   LIDOcaine viscous HCl 2% oral solution 15 mL (15 mLs Oral Given 6/1/24 0618)     Medical Decision Making  DDX includes but not limited to: ACS, PE, dissection, cardiac tamponade, pneumothorax, msk pain, pleurisy, pneumonia, pleural effusion, GERD, esophagitis, gastritis       Given DuoNeb  Nitroglycerin given and patient does not think it helped   CTA negative for PE   Labs nml, troponin negative  EKG nml  Improved with GI cocktail  After further info from patient and daughter, pain began after taking Doxy pill and eating, symptoms worse with eating. Suspect esophagitis  Improved with GI cocktail  Rx Carafate and Protonix     Problems Addressed:  Acute chest pain: acute illness or injury that poses a threat to life or bodily functions  Pill esophagitis: acute illness or injury that poses a threat to life or bodily functions    Amount and/or Complexity of Data Reviewed  Labs: ordered. Decision-making details documented in ED Course.  Radiology: ordered. Decision-making details documented in ED Course.  ECG/medicine tests: ordered. Decision-making details documented in ED Course.    Risk  OTC drugs.  Prescription drug management.  Decision regarding hospitalization.               ED Course as of 06/01/24 0846   Sat Jun 01, 2024   1669 Patient reports pain is not improved after nebulizer treatment.  We will give her nitroglycerin and reassess.  Also discussed obtaining CT angiogram to rule out pulmonary embolism in the setting of COVID [KM]   0553 Discussed lab results and CT scan with patient and her daughter.  Patient states she does not feel much better.  We had a long discussion regarding admission for more cardiac workup versus discharge home.  At this time, her daughter mentions that patient states the pain started at 10:00 p.m. a few nights ago after she ate a [employer] bar and took her 1st dose of medicine in the food and medicine felt like it got  stuck.  Patient now states that when she eats something it feels like a burning sensation in her chest.  Will give GI cocktail and reassessed [KM]   0713 Patient's symptoms have nearly resolved.  I discussed with patient in the daughter and we will agree this is likely a pill esophagitis/gastritis, she was on Doxy which has high risk of this.  I will prescribe her Carafate as well as Protonix for short course.  She was also requesting the nebulizer treatment as it did help with her coughing. [KM]      ED Course User Index  [KM] Adelaida Salter MD                           Clinical Impression:  Final diagnoses:  [R07.9] Chest pain  [R07.9] Acute chest pain  [K20.80, T50.905A] Pill esophagitis (Primary)          ED Disposition Condition    Discharge Stable          ED Prescriptions       Medication Sig Dispense Start Date End Date Auth. Provider    pantoprazole (PROTONIX) 20 MG tablet Take 1 tablet (20 mg total) by mouth once daily. for 14 days 14 tablet 6/1/2024 6/15/2024 Adelaida Salter MD    sucralfate (CARAFATE) 1 gram tablet Take 1 tablet (1 g total) by mouth 4 (four) times daily before meals and nightly. for 14 days 56 tablet 6/1/2024 6/15/2024 Adelaida Salter MD    albuterol sulfate 2.5 mg/0.5 mL Nebu Take 2.5 mg by nebulization every 6 (six) hours as needed (wheezing/shortness of breath). Rescue 10 each 6/1/2024 6/1/2025 Adelaida Salter MD          Follow-up Information       Follow up With Specialties Details Why Contact Info    [facility] Schedule an appointment as soon as possible for a visit   [address] [facility] [address]  [phone]      [facility] - Emergency Dept Emergency Medicine  As needed, If symptoms worsen [address] [facility] [id number] [phone]             Adelaida Salter MD  06/01/24 7876

## 2024-06-02 LAB
OHS QRS DURATION: 88 MS
OHS QTC CALCULATION: 457 MS

## 2024-06-17 ENCOUNTER — OFFICE VISIT (OUTPATIENT)
Dept: URGENT CARE | Facility: CLINIC | Age: 84
End: 2024-06-17
Payer: MEDICARE

## 2024-06-17 VITALS
TEMPERATURE: 98 F | HEART RATE: 83 BPM | BODY MASS INDEX: 28.22 KG/M2 | HEIGHT: 59 IN | RESPIRATION RATE: 20 BRPM | SYSTOLIC BLOOD PRESSURE: 128 MMHG | DIASTOLIC BLOOD PRESSURE: 81 MMHG | WEIGHT: 140 LBS | OXYGEN SATURATION: 97 %

## 2024-06-17 DIAGNOSIS — J01.01 ACUTE RECURRENT MAXILLARY SINUSITIS: Primary | ICD-10-CM

## 2024-06-17 PROCEDURE — 96372 THER/PROPH/DIAG INJ SC/IM: CPT | Mod: ,,, | Performed by: NURSE PRACTITIONER

## 2024-06-17 PROCEDURE — 99213 OFFICE O/P EST LOW 20 MIN: CPT | Mod: 25,,, | Performed by: NURSE PRACTITIONER

## 2024-06-17 RX ORDER — AZELASTINE 1 MG/ML
1 SPRAY, METERED NASAL 2 TIMES DAILY
Qty: 30 ML | Refills: 1 | Status: SHIPPED | OUTPATIENT
Start: 2024-06-17 | End: 2024-07-17

## 2024-06-17 RX ORDER — FLUTICASONE PROPIONATE 50 MCG
1 SPRAY, SUSPENSION (ML) NASAL DAILY
COMMUNITY

## 2024-06-17 RX ORDER — BETAMETHASONE SODIUM PHOSPHATE AND BETAMETHASONE ACETATE 3; 3 MG/ML; MG/ML
6 INJECTION, SUSPENSION INTRA-ARTICULAR; INTRALESIONAL; INTRAMUSCULAR; SOFT TISSUE
Status: COMPLETED | OUTPATIENT
Start: 2024-06-17 | End: 2024-06-17

## 2024-06-17 RX ADMIN — BETAMETHASONE SODIUM PHOSPHATE AND BETAMETHASONE ACETATE 6 MG: 3; 3 INJECTION, SUSPENSION INTRA-ARTICULAR; INTRALESIONAL; INTRAMUSCULAR; SOFT TISSUE at 09:06

## 2024-06-17 NOTE — PROGRESS NOTES
"Subjective:      Patient ID: Nisha Arroyo is a 84 y.o. female.    Vitals:  height is 4' 11" (1.499 m) and weight is 63.5 kg (140 lb). Her temperature is 98.1 °F (36.7 °C). Her blood pressure is 128/81 and her pulse is 83. Her respiration is 20 and oxygen saturation is 97%.     Chief Complaint: Sinus Problem    84 y.o. female presents to clinic with c/o  sneezing, clear nasal drainage starting Saturday morning. Pt was diagnosed with covid on 05/24. Pt states that symptoms occurred after using cpap.  Minimal relief using Flonase nasal spray.  Does lavage nasally.  Pt has stopped using cpap after being dx with covid.  Plans to follow-up with her PCP      Constitution: Negative for fever.   HENT:  Positive for congestion and sinus pressure.       Objective:     Physical Exam   Constitutional: She is oriented to person, place, and time. She appears well-developed. She is cooperative.  Non-toxic appearance. She does not appear ill. No distress.   HENT:   Head: Normocephalic and atraumatic.   Ears:   Right Ear: Hearing, tympanic membrane, external ear and ear canal normal.   Left Ear: Hearing, tympanic membrane, external ear and ear canal normal.   Nose: Nose normal. No mucosal edema, rhinorrhea or nasal deformity. No epistaxis. Right sinus exhibits no maxillary sinus tenderness and no frontal sinus tenderness. Left sinus exhibits no maxillary sinus tenderness and no frontal sinus tenderness.   Mouth/Throat: Uvula is midline, oropharynx is clear and moist and mucous membranes are normal. No trismus in the jaw. Normal dentition. No uvula swelling. No oropharyngeal exudate, posterior oropharyngeal edema or posterior oropharyngeal erythema.   Eyes: Conjunctivae and lids are normal. No scleral icterus.   Neck: Trachea normal and phonation normal. Neck supple. No edema present. No erythema present. No neck rigidity present.   Cardiovascular: Normal rate, regular rhythm, normal heart sounds and normal pulses.   Pulmonary/Chest: " Effort normal and breath sounds normal. No respiratory distress. She has no decreased breath sounds. She has no rhonchi.   Abdominal: Normal appearance.   Musculoskeletal: Normal range of motion.         General: No deformity. Normal range of motion.   Neurological: She is alert and oriented to person, place, and time. She exhibits normal muscle tone. Coordination normal.   Skin: Skin is warm, dry, intact, not diaphoretic and not pale.   Psychiatric: Her speech is normal and behavior is normal. Judgment and thought content normal.   Nursing note and vitals reviewed.      Assessment:     1. Acute recurrent maxillary sinusitis        Plan:   Nasal saline, Flonase nasal spray, Claritin OTC as directed  Use prescription azelastine as directed  Take Tylenol  OTC for fever and pain as directed   follow up with your primary care provider within 2-5 days if your signs and symptoms have not resolved or worsen.   If condition worsens or fails to improve we recommend that you receive another evaluation with your primary medical clinic to discuss your concerns.      Acute recurrent maxillary sinusitis  -     betamethasone acetate-betamethasone sodium phosphate injection 6 mg  -     azelastine (ASTELIN) 137 mcg (0.1 %) nasal spray; 1 spray (137 mcg total) by Nasal route 2 (two) times daily.  Dispense: 30 mL; Refill: 1

## 2024-06-17 NOTE — PATIENT INSTRUCTIONS
Nasal saline, Flonase nasal spray, Claritin OTC as directed  Use prescription azelastine as directed  Take Tylenol  OTC for fever and pain as directed   follow up with your primary care provider within 2-5 days if your signs and symptoms have not resolved or worsen.   If condition worsens or fails to improve we recommend that you receive another evaluation with your primary medical clinic to discuss your concerns.